# Patient Record
Sex: MALE | Race: BLACK OR AFRICAN AMERICAN | NOT HISPANIC OR LATINO | Employment: FULL TIME | URBAN - METROPOLITAN AREA
[De-identification: names, ages, dates, MRNs, and addresses within clinical notes are randomized per-mention and may not be internally consistent; named-entity substitution may affect disease eponyms.]

---

## 2023-04-26 ENCOUNTER — OFFICE VISIT (OUTPATIENT)
Dept: URGENT CARE | Facility: CLINIC | Age: 58
End: 2023-04-26
Payer: COMMERCIAL

## 2023-04-26 VITALS
RESPIRATION RATE: 12 BRPM | OXYGEN SATURATION: 98 % | TEMPERATURE: 97.9 F | WEIGHT: 205 LBS | HEART RATE: 82 BPM | BODY MASS INDEX: 25.49 KG/M2 | HEIGHT: 75 IN

## 2023-04-26 DIAGNOSIS — S46.112A STRAIN OF MUSCLE, FASCIA AND TENDON OF LONG HEAD OF BICEPS, LEFT ARM, INITIAL ENCOUNTER: ICD-10-CM

## 2023-04-26 DIAGNOSIS — R51.9 ACUTE INTRACTABLE HEADACHE, UNSPECIFIED HEADACHE TYPE: ICD-10-CM

## 2023-04-26 PROCEDURE — 99203 OFFICE O/P NEW LOW 30 MIN: CPT | Performed by: NURSE PRACTITIONER

## 2023-04-26 RX ORDER — KETOROLAC TROMETHAMINE 30 MG/ML
30 INJECTION, SOLUTION INTRAMUSCULAR; INTRAVENOUS ONCE
Status: COMPLETED | OUTPATIENT
Start: 2023-04-26 | End: 2023-04-26

## 2023-04-26 RX ADMIN — KETOROLAC TROMETHAMINE 30 MG: 30 INJECTION, SOLUTION INTRAMUSCULAR; INTRAVENOUS at 09:06

## 2023-04-26 ASSESSMENT — ENCOUNTER SYMPTOMS: HEADACHES: 1

## 2023-04-26 NOTE — LETTER
April 26, 2023    To Whom It May Concern:         This is confirmation that Juancarlos Cruz attended his scheduled appointment with ROLLY Murphy on 4/26/23.  Please excuse his absence through 4/28/2023.  He may return sooner if better.         If you have any questions please do not hesitate to call me at the phone number listed below.    Sincerely,          SIENNA Murphy.  [phone]

## 2023-04-26 NOTE — PROGRESS NOTES
"Subjective:     Juancarlos Cruz is a 58 y.o. male who presents for Headache (Head rush/pain, no blurred vision,  x 2 days)      Headache  Pt presents for evaluation of a new problem.  Juancarlos is a pleasant 58-year-old male who presents to urgent care today with complaints of a headache that is been ongoing for the past 3 days.  Has had pain does worsen with bending his head down in a dependent position.  He has had some mild congestion.  His pain is in his bilateral frontal region.  He has not use any medication for relief of his discomfort.  His headache does wax and wane.  He denies any changes in vision, altered mental status, fatigue, nausea or vomiting.  Additionally, he does complain of left-sided arm pain that started yesterday.  He notes that his arm pain immediately started after a bus ride where he was using his left arm to hold him up while he was talking to the .  He notes that his arm was in this position for approximately 20 minutes.  His pain in his arm is constant but does worsen with certain movements.  He denies any numbness or tingling.  His pain is localized to elbow and forearm.  He denies any known direct injury.  Negative for changes with range of motion.  He is right-hand dominant.    Review of Systems   Neurological:  Positive for headaches.     PMH: History reviewed. No pertinent past medical history.  ALLERGIES: No Known Allergies  SURGHX: History reviewed. No pertinent surgical history.  SOCHX:   Social History     Socioeconomic History    Marital status: Single   Tobacco Use    Smoking status: Every Day     Types: Cigarettes    Smokeless tobacco: Never   Vaping Use    Vaping Use: Never used   Substance and Sexual Activity    Alcohol use: Yes     FH: History reviewed. No pertinent family history.      Objective:   BP (!) 138/90   Pulse 82   Temp 36.6 °C (97.9 °F) (Temporal)   Resp 12   Ht 1.905 m (6' 3\")   Wt 93 kg (205 lb)   SpO2 98%   BMI 25.62 kg/m²   138/90  Physical " Exam  Vitals and nursing note reviewed.   Constitutional:       General: He is not in acute distress.     Appearance: Normal appearance. He is normal weight. He is not ill-appearing or toxic-appearing.   HENT:      Head: Normocephalic.      Right Ear: External ear normal.      Left Ear: External ear normal.      Nose: Nose normal.      Mouth/Throat:      Mouth: Mucous membranes are moist.   Eyes:      General:         Right eye: No discharge.         Left eye: No discharge.      Extraocular Movements: Extraocular movements intact.      Conjunctiva/sclera: Conjunctivae normal.      Pupils: Pupils are equal, round, and reactive to light.   Pulmonary:      Effort: Pulmonary effort is normal.      Breath sounds: Normal breath sounds.   Abdominal:      General: Abdomen is flat.   Musculoskeletal:         General: Normal range of motion.      Left forearm: No swelling, edema, deformity, lacerations, tenderness or bony tenderness.      Cervical back: Normal range of motion and neck supple. No rigidity.      Comments: No abnormal findings on physical exam of left arm.  Strength of bilateral upper extremities 5/5.  Range of motion and sensation intact.   Lymphadenopathy:      Cervical: No cervical adenopathy.   Skin:     General: Skin is warm and dry.   Neurological:      General: No focal deficit present.      Mental Status: He is alert and oriented to person, place, and time. Mental status is at baseline.      Cranial Nerves: No cranial nerve deficit.      Sensory: No sensory deficit.      Motor: No weakness.      Coordination: Coordination normal.      Gait: Gait normal.      Deep Tendon Reflexes: Reflexes normal.   Psychiatric:         Mood and Affect: Mood normal.         Behavior: Behavior normal.         Judgment: Judgment normal.       Assessment/Plan:   Assessment    1. Acute intractable headache, unspecified headache type  ketorolac (TORADOL) injection 30 mg      2. Strain of muscle, fascia and tendon of long head  of biceps, left arm, initial encounter  ketorolac (TORADOL) injection 30 mg        I do believe that he is suffering from a muscle strain of left arm due to long help positioning during bus ride.  I did encourage elevation, ice and NSAIDs. Toradol injection given in clinic and pt tolerated this well. Pt was advised to refrain from additional NSAIDS for the next 48 hours following this injection. Acetaminophen may be used every 4 hours as needed for additional relief of pain. Pt educated not to exceed more than advised amount on bottle.   Differentials of headache discussed with patient today.  His blood pressure is slightly elevated which he notes has been discussed with his PCP recently.  Other differentials could include URI and rhinosinusitis.  Strict return precautions given for worsening headache.  He is in agreement with plan of care today.  AVS handout given and reviewed with patient. Pt educated on red flags and when to seek treatment back in ER or UC.